# Patient Record
Sex: FEMALE | Race: WHITE | Employment: FULL TIME | URBAN - METROPOLITAN AREA
[De-identification: names, ages, dates, MRNs, and addresses within clinical notes are randomized per-mention and may not be internally consistent; named-entity substitution may affect disease eponyms.]

---

## 2017-03-06 PROBLEM — G43.119 INTRACTABLE MIGRAINE WITH AURA WITHOUT STATUS MIGRAINOSUS: Status: ACTIVE | Noted: 2017-03-06

## 2018-04-30 DIAGNOSIS — G43.119 INTRACTABLE MIGRAINE WITH AURA WITHOUT STATUS MIGRAINOSUS: ICD-10-CM

## 2018-04-30 RX ORDER — RIZATRIPTAN BENZOATE 10 MG/1
TABLET ORAL
Qty: 30 TABLET | Refills: 5 | Status: SHIPPED | OUTPATIENT
Start: 2018-04-30

## 2018-05-07 ENCOUNTER — OFFICE VISIT (OUTPATIENT)
Dept: PRIMARY CARE CLINIC | Age: 63
End: 2018-05-07
Payer: COMMERCIAL

## 2018-05-07 VITALS
BODY MASS INDEX: 19.49 KG/M2 | DIASTOLIC BLOOD PRESSURE: 60 MMHG | WEIGHT: 117 LBS | TEMPERATURE: 97.2 F | SYSTOLIC BLOOD PRESSURE: 118 MMHG | HEIGHT: 65 IN | HEART RATE: 68 BPM | OXYGEN SATURATION: 98 % | RESPIRATION RATE: 16 BRPM

## 2018-05-07 DIAGNOSIS — F32.A ANXIETY AND DEPRESSION: Primary | ICD-10-CM

## 2018-05-07 DIAGNOSIS — M62.838 MUSCLE SPASM: ICD-10-CM

## 2018-05-07 DIAGNOSIS — F41.9 ANXIETY AND DEPRESSION: Primary | ICD-10-CM

## 2018-05-07 DIAGNOSIS — G43.119 INTRACTABLE MIGRAINE WITH AURA WITHOUT STATUS MIGRAINOSUS: ICD-10-CM

## 2018-05-07 PROCEDURE — 99214 OFFICE O/P EST MOD 30 MIN: CPT | Performed by: PHYSICIAN ASSISTANT

## 2018-05-07 RX ORDER — CITALOPRAM 20 MG/1
20 TABLET ORAL DAILY
Qty: 30 TABLET | Refills: 5 | Status: CANCELLED | OUTPATIENT
Start: 2018-05-07

## 2018-05-07 RX ORDER — CITALOPRAM 40 MG/1
40 TABLET ORAL EVERY MORNING
Qty: 30 TABLET | Refills: 5 | Status: SHIPPED
Start: 2018-05-07 | End: 2022-01-10

## 2018-05-07 RX ORDER — AMITRIPTYLINE HYDROCHLORIDE 10 MG/1
TABLET, FILM COATED ORAL
Qty: 60 TABLET | Refills: 2 | Status: SHIPPED | OUTPATIENT
Start: 2018-05-07

## 2018-05-07 RX ORDER — BUTALBITAL, ACETAMINOPHEN AND CAFFEINE 50; 325; 40 MG/1; MG/1; MG/1
TABLET ORAL
Qty: 30 TABLET | Refills: 1 | Status: SHIPPED | OUTPATIENT
Start: 2018-05-07

## 2018-05-07 ASSESSMENT — PATIENT HEALTH QUESTIONNAIRE - PHQ9
1. LITTLE INTEREST OR PLEASURE IN DOING THINGS: 0
2. FEELING DOWN, DEPRESSED OR HOPELESS: 0
SUM OF ALL RESPONSES TO PHQ9 QUESTIONS 1 & 2: 0
SUM OF ALL RESPONSES TO PHQ QUESTIONS 1-9: 0

## 2019-07-19 ENCOUNTER — HOSPITAL ENCOUNTER (OUTPATIENT)
Age: 64
Discharge: HOME | End: 2019-07-21

## 2019-07-19 PROCEDURE — 88305 TISSUE EXAM BY PATHOLOGIST: CPT

## 2019-07-19 PROCEDURE — 87081 CULTURE SCREEN ONLY: CPT

## 2019-07-21 LAB — CLOTEST: NORMAL

## 2020-10-19 ENCOUNTER — OFFICE VISIT (OUTPATIENT)
Dept: PODIATRY | Age: 65
End: 2020-10-19
Payer: COMMERCIAL

## 2020-10-19 VITALS
HEIGHT: 65 IN | SYSTOLIC BLOOD PRESSURE: 118 MMHG | DIASTOLIC BLOOD PRESSURE: 64 MMHG | BODY MASS INDEX: 19.66 KG/M2 | WEIGHT: 118 LBS | TEMPERATURE: 97.2 F

## 2020-10-19 PROBLEM — M21.611 BUNION, RIGHT FOOT: Status: ACTIVE | Noted: 2020-10-19

## 2020-10-19 PROBLEM — M79.674 PAIN OF TOE OF RIGHT FOOT: Status: ACTIVE | Noted: 2020-10-19

## 2020-10-19 PROBLEM — B35.1 ONYCHOMYCOSIS: Status: ACTIVE | Noted: 2020-10-19

## 2020-10-19 PROCEDURE — 4040F PNEUMOC VAC/ADMIN/RCVD: CPT | Performed by: PODIATRIST

## 2020-10-19 PROCEDURE — 1036F TOBACCO NON-USER: CPT | Performed by: PODIATRIST

## 2020-10-19 PROCEDURE — G8420 CALC BMI NORM PARAMETERS: HCPCS | Performed by: PODIATRIST

## 2020-10-19 PROCEDURE — G8484 FLU IMMUNIZE NO ADMIN: HCPCS | Performed by: PODIATRIST

## 2020-10-19 PROCEDURE — 1123F ACP DISCUSS/DSCN MKR DOCD: CPT | Performed by: PODIATRIST

## 2020-10-19 PROCEDURE — 1090F PRES/ABSN URINE INCON ASSESS: CPT | Performed by: PODIATRIST

## 2020-10-19 PROCEDURE — 3017F COLORECTAL CA SCREEN DOC REV: CPT | Performed by: PODIATRIST

## 2020-10-19 PROCEDURE — 99213 OFFICE O/P EST LOW 20 MIN: CPT | Performed by: PODIATRIST

## 2020-10-19 PROCEDURE — G8400 PT W/DXA NO RESULTS DOC: HCPCS | Performed by: PODIATRIST

## 2020-10-19 PROCEDURE — G8427 DOCREV CUR MEDS BY ELIG CLIN: HCPCS | Performed by: PODIATRIST

## 2020-10-19 PROCEDURE — G9899 SCRN MAM PERF RSLTS DOC: HCPCS | Performed by: PODIATRIST

## 2020-10-19 NOTE — PROGRESS NOTES
10/19/20     Patrizia Mensah    : 1955 Sex: female   Age: 72 y.o. Patient was referred by: None  Patient's PCP/Provider is:  Sharif Cruz DO    Subjective:    Patient seen today for evaluation regarding dystrophy and tenderness to her right third toe. Chief Complaint   Patient presents with    Nail Problem    Callouses       HPI: She stated she injured her toe several years ago has tried multiple treatments without improvement noted. She does have discomfort with shoe gear irritation with ambulatory activities. Patient also was complaining of some mild bunion type pain to her right foot with certain shoe gear. No other additional abnormalities noted at this time. ROS:  Const: Positives and pertinent negatives as per HPI. Musculo: Denies symptoms other than stated above. Neuro: Denies symptoms other than stated above. Skin: Denies symptoms other than stated above.     Current Medications:    Current Outpatient Medications:     butalbital-acetaminophen-caffeine (FIORICET, ESGIC) -40 MG per tablet, TAKE ONE TABLET BY MOUTH EVERY 4 HOURS AS NEEDED FOR HEADACHES, Disp: 30 tablet, Rfl: 1    amitriptyline (ELAVIL) 10 MG tablet, 1 po nightly for 1 week, then increase to 2 tabs PO nightly, Disp: 60 tablet, Rfl: 2    citalopram (CELEXA) 40 MG tablet, Take 1 tablet by mouth every morning, Disp: 30 tablet, Rfl: 5    rizatriptan (MAXALT) 10 MG tablet, take 1 tablet by mouth once as needed for headache, Disp: 30 tablet, Rfl: 5    SUMAtriptan (IMITREX) 25 MG tablet, Take 25 mg by mouth once as needed for Migraine, Disp: , Rfl:     estradiol (ESTRACE VAGINAL) 0.1 MG/GM vaginal cream, Apply pea size amount vaginally 3 times a week at night time, Disp: 60 g, Rfl: 1    omeprazole (PRILOSEC) 40 MG delayed release capsule, , Disp: , Rfl:     ipratropium (ATROVENT) 0.03 % nasal spray, 2 sprays by Nasal route 3 times daily, Disp: 1 Bottle, Rfl: 0    vitamin D 1000 UNITS CAPS, Take 1 capsule by mouth daily, Disp: , Rfl:     ascorbic acid (VITAMIN C) 500 MG tablet, Take 500 mg by mouth daily 2 tablets daily, Disp: , Rfl:     Biotin 5000 MCG CAPS, Take by mouth, Disp: , Rfl:     acetaminophen (TYLENOL) 500 MG tablet, Take 1,000 mg by mouth as needed for Pain, Disp: , Rfl:     Allergies:  No Known Allergies    Vitals:    10/19/20 0813   BP: 118/64   Temp: 97.2 °F (36.2 °C)   Weight: 118 lb (53.5 kg)   Height: 5' 5\" (1.651 m)        Past Medical History:   Diagnosis Date    Cephalgia     Tension    Migraine     She has has accupuncture and has been getting massage     Family History   Problem Relation Age of Onset    Diabetes Maternal Grandfather     Colon Cancer Maternal Grandfather     Hypertension Father     Heart Disease Father     Stroke Mother     Hypertension Mother     Osteoporosis Mother     Thyroid Disease Mother     Migraines Mother     Arthritis Brother      Past Surgical History:   Procedure Laterality Date    BREAST ENHANCEMENT SURGERY      FE BREAST AUGMENTATION     COLONOSCOPY  2014    HERNIA REPAIR Left     inguinal    NOSE SURGERY Bilateral     deviated septum    UPPER GASTROINTESTINAL ENDOSCOPY       Social History     Tobacco Use    Smoking status: Former Smoker     Packs/day: 1.00     Years: 10.00     Pack years: 10.00     Last attempt to quit: 3/24/1982     Years since quittin.6    Smokeless tobacco: Never Used   Substance Use Topics    Alcohol use: Yes     Comment: occasionally    Drug use: No           Diagnostic studies:    No results found. Procedures:    None    Exam:  VASCULAR: Pedal pulses palpable right foot. Capillary fill time brisk digits 1 through 5 right foot. NEUROLOGICAL: Epicritic sensations intact right foot. DERMATOLOGICAL: No edema or ecchymotic skin changes present right foot. Dystrophy and discoloration present to the right third toe. Tenderness noted to palpation.   No ingrown nail borders noted or any signs of bacterial infection noted. No maceration webspaces noted right foot. MUSCULOSKELETAL: Mild bunion deformity noted right foot with tenderness dorsal medial eminence. Adequate range of motion right great toe first MTPJ region. Plan Per Assessment  Chloe Lidnsay was seen today for nail problem and callouses. Diagnoses and all orders for this visit:    Onychomycosis    Pain of toe of right foot    Bunion, right foot        1. New patient evaluation and management  2. We did recommend the use of a topical nail lacquer to the affected digital nail to be applied daily. Prescription medication Penlac given with exact instructions on usage. I discussed the potential side effects that the patient may experience with the medication and the need to call if they experience any. 3. We did discuss the use of OTC insole to be worn to prevent worsening of the bunion issue right foot. 4. Patient will be followed up in 6 weeks time or sooner if needed for reevaluation. She was advised to call the office with any questions or concerns in the interim. Seen By:    Martínez Chowdary DPM    Electronically signed by Martínez Chowdary DPM on 10/19/2020 at 8:27 AM      This note was created using voice recognition software. The note was reviewed however may contain grammatical errors.

## 2020-10-19 NOTE — PROGRESS NOTES
Patient is here today for evaluation of 3rd right toe nail. She states she had an injury to it a few years ago. She is a previous patient but has not been here in a few years. She also has a callus on the right foot.  pcp is Shira Martino DO last ov 12-

## 2020-12-23 ENCOUNTER — OFFICE VISIT (OUTPATIENT)
Dept: PODIATRY | Age: 65
End: 2020-12-23
Payer: COMMERCIAL

## 2020-12-23 VITALS — HEIGHT: 65 IN | WEIGHT: 118 LBS | BODY MASS INDEX: 19.66 KG/M2

## 2020-12-23 PROCEDURE — G9899 SCRN MAM PERF RSLTS DOC: HCPCS | Performed by: PODIATRIST

## 2020-12-23 PROCEDURE — 3017F COLORECTAL CA SCREEN DOC REV: CPT | Performed by: PODIATRIST

## 2020-12-23 PROCEDURE — G8484 FLU IMMUNIZE NO ADMIN: HCPCS | Performed by: PODIATRIST

## 2020-12-23 PROCEDURE — 1123F ACP DISCUSS/DSCN MKR DOCD: CPT | Performed by: PODIATRIST

## 2020-12-23 PROCEDURE — G8420 CALC BMI NORM PARAMETERS: HCPCS | Performed by: PODIATRIST

## 2020-12-23 PROCEDURE — 1036F TOBACCO NON-USER: CPT | Performed by: PODIATRIST

## 2020-12-23 PROCEDURE — 1090F PRES/ABSN URINE INCON ASSESS: CPT | Performed by: PODIATRIST

## 2020-12-23 PROCEDURE — 4040F PNEUMOC VAC/ADMIN/RCVD: CPT | Performed by: PODIATRIST

## 2020-12-23 PROCEDURE — G8427 DOCREV CUR MEDS BY ELIG CLIN: HCPCS | Performed by: PODIATRIST

## 2020-12-23 PROCEDURE — 99213 OFFICE O/P EST LOW 20 MIN: CPT | Performed by: PODIATRIST

## 2020-12-23 PROCEDURE — G8399 PT W/DXA RESULTS DOCUMENT: HCPCS | Performed by: PODIATRIST

## 2020-12-23 NOTE — PROGRESS NOTES
Patient is in today for 6 week follow up of right 4th toe nail issue. Patient has been using the medication as prescribed and has no additional issues at this time.  pcp is Lazara Nieves DO

## 2020-12-23 NOTE — PROGRESS NOTES
20     Talia Carvalho    : 1955   Sex: female    Age: 72 y.o. Patient's PCP/Provider is:  Ha Up DO    Subjective:  Patient is seen today for follow-up regarding continued care regarding dystrophy digital nail right foot. She is also interested in discussing treatment options regarding bunion issues right foot. No other additional abnormalities noted at this time. Chief Complaint   Patient presents with    Nail Problem     nail fungus, right 3rd toe        ROS:  Const: Positives and pertinent negatives as per HPI. Musculo: Denies symptoms other than stated above. Neuro: Denies symptoms other than stated above. Skin: Denies symptoms other than stated above. Current Medications:    Current Outpatient Medications:     ciclopirox (PENLAC) 8 % solution, Apply topically daily to affected nails. , Disp: 6 mL, Rfl: 2    butalbital-acetaminophen-caffeine (FIORICET, ESGIC) -40 MG per tablet, TAKE ONE TABLET BY MOUTH EVERY 4 HOURS AS NEEDED FOR HEADACHES, Disp: 30 tablet, Rfl: 1    amitriptyline (ELAVIL) 10 MG tablet, 1 po nightly for 1 week, then increase to 2 tabs PO nightly, Disp: 60 tablet, Rfl: 2    citalopram (CELEXA) 40 MG tablet, Take 1 tablet by mouth every morning, Disp: 30 tablet, Rfl: 5    rizatriptan (MAXALT) 10 MG tablet, take 1 tablet by mouth once as needed for headache, Disp: 30 tablet, Rfl: 5    SUMAtriptan (IMITREX) 25 MG tablet, Take 25 mg by mouth once as needed for Migraine, Disp: , Rfl:     omeprazole (PRILOSEC) 40 MG delayed release capsule, , Disp: , Rfl:     vitamin D 1000 UNITS CAPS, Take 1 capsule by mouth daily, Disp: , Rfl:     ascorbic acid (VITAMIN C) 500 MG tablet, Take 500 mg by mouth daily 2 tablets daily, Disp: , Rfl:     Biotin 5000 MCG CAPS, Take by mouth, Disp: , Rfl:     acetaminophen (TYLENOL) 500 MG tablet, Take 1,000 mg by mouth as needed for Pain, Disp: , Rfl:     ipratropium (ATROVENT) 0.03 % nasal spray, 2 sprays by Nasal route 3 times daily, Disp: 1 Bottle, Rfl: 0    Allergies:  No Known Allergies    Vitals:    12/23/20 0700   Weight: 118 lb (53.5 kg)   Height: 5' 5\" (1.651 m)       Exam:  Neurovascular status unchanged. Right third digital nail is improved to the proximal third of the nailbed region. Mild dystrophy noted distal aspect with tenderness noted to palpation. No signs of infection noted right third toe. Bunion issue noted right foot with prominence at the dorsal medial eminence. Adequate range of motion right great toe first MTPJ region. No edema or ecchymotic skin changes present medial right forefoot region. Diagnostic Studies:     No results found. Procedures:    None    Plan Per Assessment  Frida Jenkins was seen today for nail problem. Diagnoses and all orders for this visit:    Onychomycosis    Pain of toe of right foot  -     Amb External Referral For Orthotics    Bunion, right foot  -     Amb External Referral For Orthotics      1. Evaluation and management  2. We did discuss continued use of the topical nail regimen regarding the digital nail issue daily. 3. Patient was given information on purchasing OTC insoles to be utilized with her existing shoe gear to prevent potential bunion issues right foot. Shoe recommendations were discussed with patient in detail today as well. 4. Patient will be followed up at a later date for continued evaluation and management. Seen By:    Constanza Genao DPM    Electronically signed by Constanza Genao DPM on 12/23/2020 at 7:21 AM    This note was created using voice recognition software. The note was reviewed however may contain grammatical errors.

## 2021-02-03 ENCOUNTER — OFFICE VISIT (OUTPATIENT)
Dept: PODIATRY | Age: 66
End: 2021-02-03
Payer: COMMERCIAL

## 2021-02-03 VITALS — WEIGHT: 118 LBS | BODY MASS INDEX: 19.66 KG/M2 | HEIGHT: 65 IN

## 2021-02-03 DIAGNOSIS — B35.1 ONYCHOMYCOSIS: Primary | ICD-10-CM

## 2021-02-03 DIAGNOSIS — M79.674 PAIN OF TOE OF RIGHT FOOT: ICD-10-CM

## 2021-02-03 DIAGNOSIS — M21.611 BUNION, RIGHT FOOT: ICD-10-CM

## 2021-02-03 PROCEDURE — 1090F PRES/ABSN URINE INCON ASSESS: CPT | Performed by: PODIATRIST

## 2021-02-03 PROCEDURE — G8399 PT W/DXA RESULTS DOCUMENT: HCPCS | Performed by: PODIATRIST

## 2021-02-03 PROCEDURE — 99212 OFFICE O/P EST SF 10 MIN: CPT | Performed by: PODIATRIST

## 2021-02-03 PROCEDURE — 4040F PNEUMOC VAC/ADMIN/RCVD: CPT | Performed by: PODIATRIST

## 2021-02-03 PROCEDURE — G9899 SCRN MAM PERF RSLTS DOC: HCPCS | Performed by: PODIATRIST

## 2021-02-03 PROCEDURE — G8427 DOCREV CUR MEDS BY ELIG CLIN: HCPCS | Performed by: PODIATRIST

## 2021-02-03 PROCEDURE — 1123F ACP DISCUSS/DSCN MKR DOCD: CPT | Performed by: PODIATRIST

## 2021-02-03 PROCEDURE — 1036F TOBACCO NON-USER: CPT | Performed by: PODIATRIST

## 2021-02-03 PROCEDURE — 3017F COLORECTAL CA SCREEN DOC REV: CPT | Performed by: PODIATRIST

## 2021-02-03 PROCEDURE — G8484 FLU IMMUNIZE NO ADMIN: HCPCS | Performed by: PODIATRIST

## 2021-02-03 PROCEDURE — G8420 CALC BMI NORM PARAMETERS: HCPCS | Performed by: PODIATRIST

## 2021-02-03 NOTE — PROGRESS NOTES
2/3/21     Sunshine Lawler    : 1955   Sex: female    Age: 72 y.o. Patient's PCP/Provider is:  Melinda Singh DO    Subjective:  Patient is seen today for follow-up regarding continued care mycotic nail issues to her right third digital area. Patient has been applying the topical medication as instructed without issues. No other additional abnormalities noted at this time. Chief Complaint   Patient presents with    Toe Pain     3rd toe right       ROS:  Const: Positives and pertinent negatives as per HPI. Musculo: Denies symptoms other than stated above. Neuro: Denies symptoms other than stated above. Skin: Denies symptoms other than stated above. Current Medications:    Current Outpatient Medications:     ciclopirox (PENLAC) 8 % solution, Apply topically daily to affected nails. , Disp: 6 mL, Rfl: 2    butalbital-acetaminophen-caffeine (FIORICET, ESGIC) -40 MG per tablet, TAKE ONE TABLET BY MOUTH EVERY 4 HOURS AS NEEDED FOR HEADACHES, Disp: 30 tablet, Rfl: 1    amitriptyline (ELAVIL) 10 MG tablet, 1 po nightly for 1 week, then increase to 2 tabs PO nightly, Disp: 60 tablet, Rfl: 2    citalopram (CELEXA) 40 MG tablet, Take 1 tablet by mouth every morning, Disp: 30 tablet, Rfl: 5    rizatriptan (MAXALT) 10 MG tablet, take 1 tablet by mouth once as needed for headache, Disp: 30 tablet, Rfl: 5    SUMAtriptan (IMITREX) 25 MG tablet, Take 25 mg by mouth once as needed for Migraine, Disp: , Rfl:     omeprazole (PRILOSEC) 40 MG delayed release capsule, , Disp: , Rfl:     vitamin D 1000 UNITS CAPS, Take 1 capsule by mouth daily, Disp: , Rfl:     ascorbic acid (VITAMIN C) 500 MG tablet, Take 500 mg by mouth daily 2 tablets daily, Disp: , Rfl:     Biotin 5000 MCG CAPS, Take by mouth, Disp: , Rfl:     acetaminophen (TYLENOL) 500 MG tablet, Take 1,000 mg by mouth as needed for Pain, Disp: , Rfl:     ipratropium (ATROVENT) 0.03 % nasal spray, 2 sprays by Nasal route 3 times daily, Disp: 1 Bottle, Rfl: 0    Allergies:  No Known Allergies    Vitals:    02/03/21 0708   Weight: 118 lb (53.5 kg)   Height: 5' 5\" (1.651 m)       Exam:  Neurovascular status unchanged. Digital nail right third toe is improved with thickness and coloration to the proximal third of the nailbed region. No signs of ingrown nail issues noted. No maceration webspaces noted right foot. No signs of infection noted digital regions right foot. Diagnostic Studies:     No results found. Procedures:    None    Plan Per Assessment  Scott Reynolds was seen today for toe pain. Diagnoses and all orders for this visit:    Onychomycosis    Pain of toe of right foot    Bunion, right foot      1. Evaluation and management  2. We did discuss additional treatment options with use of the OTC topical medications to be performed daily. 3. Patient was advised continued use of her good supportive insoles to help prevent worsening of the bunion issues bilateral foot. 4. Patient will be followed up in 2 months time or sooner if needed for reevaluation. She was advised to call the office with any questions or concerns in the interim. Seen By:    Suki Suarez DPM    Electronically signed by Suki Suarez DPM on 2/3/2021 at 8:13 AM    This note was created using voice recognition software. The note was reviewed however may contain grammatical errors.

## 2021-02-03 NOTE — PROGRESS NOTES
Patient here for a 6 week follow up 3rd toe right foot. Patient has no new concerns.           Electronically signed by Jenna Banuelos MA on 2/3/2021 at 7:07 AM

## 2021-03-31 ENCOUNTER — HOSPITAL ENCOUNTER (OUTPATIENT)
Age: 66
Discharge: HOME | End: 2021-04-02

## 2021-03-31 PROCEDURE — 88305 TISSUE EXAM BY PATHOLOGIST: CPT

## 2021-03-31 PROCEDURE — 88311 DECALCIFY TISSUE: CPT

## 2021-04-14 ENCOUNTER — OFFICE VISIT (OUTPATIENT)
Dept: PODIATRY | Age: 66
End: 2021-04-14
Payer: COMMERCIAL

## 2021-04-14 VITALS — HEIGHT: 65 IN | WEIGHT: 118 LBS | BODY MASS INDEX: 19.66 KG/M2

## 2021-04-14 DIAGNOSIS — M79.674 PAIN OF TOE OF RIGHT FOOT: ICD-10-CM

## 2021-04-14 DIAGNOSIS — L60.0 OC (ONYCHOCRYPTOSIS): ICD-10-CM

## 2021-04-14 DIAGNOSIS — B35.1 ONYCHOMYCOSIS: Primary | ICD-10-CM

## 2021-04-14 PROCEDURE — 1036F TOBACCO NON-USER: CPT | Performed by: PODIATRIST

## 2021-04-14 PROCEDURE — 99212 OFFICE O/P EST SF 10 MIN: CPT | Performed by: PODIATRIST

## 2021-04-14 PROCEDURE — 3017F COLORECTAL CA SCREEN DOC REV: CPT | Performed by: PODIATRIST

## 2021-04-14 PROCEDURE — G9899 SCRN MAM PERF RSLTS DOC: HCPCS | Performed by: PODIATRIST

## 2021-04-14 PROCEDURE — 4040F PNEUMOC VAC/ADMIN/RCVD: CPT | Performed by: PODIATRIST

## 2021-04-14 PROCEDURE — G8420 CALC BMI NORM PARAMETERS: HCPCS | Performed by: PODIATRIST

## 2021-04-14 PROCEDURE — G8427 DOCREV CUR MEDS BY ELIG CLIN: HCPCS | Performed by: PODIATRIST

## 2021-04-14 PROCEDURE — 1123F ACP DISCUSS/DSCN MKR DOCD: CPT | Performed by: PODIATRIST

## 2021-04-14 PROCEDURE — G8399 PT W/DXA RESULTS DOCUMENT: HCPCS | Performed by: PODIATRIST

## 2021-04-14 PROCEDURE — 1090F PRES/ABSN URINE INCON ASSESS: CPT | Performed by: PODIATRIST

## 2021-04-14 NOTE — PROGRESS NOTES
Patient is here for follow up for fungus lwft 3rd toe. Patient is using Penlac. Patient states the nail is really thick.  Carly Razor, DO 3/31/21  Electronically signed by Lesa Schultz LPN on 5/20/4808 at 8:19 AM

## 2021-05-23 ENCOUNTER — APPOINTMENT (OUTPATIENT)
Dept: GENERAL RADIOLOGY | Age: 66
End: 2021-05-23
Payer: COMMERCIAL

## 2021-05-23 ENCOUNTER — HOSPITAL ENCOUNTER (EMERGENCY)
Age: 66
Discharge: HOME | End: 2021-05-23
Attending: EMERGENCY MEDICINE
Payer: COMMERCIAL

## 2021-05-23 VITALS
HEIGHT: 65 IN | OXYGEN SATURATION: 98 % | HEART RATE: 76 BPM | SYSTOLIC BLOOD PRESSURE: 116 MMHG | BODY MASS INDEX: 19.66 KG/M2 | WEIGHT: 118 LBS | RESPIRATION RATE: 18 BRPM | TEMPERATURE: 96.8 F | DIASTOLIC BLOOD PRESSURE: 60 MMHG

## 2021-05-23 DIAGNOSIS — M70.41 PREPATELLAR BURSITIS OF RIGHT KNEE: Primary | ICD-10-CM

## 2021-05-23 PROCEDURE — 99284 EMERGENCY DEPT VISIT MOD MDM: CPT

## 2021-05-23 PROCEDURE — 73560 X-RAY EXAM OF KNEE 1 OR 2: CPT

## 2021-05-23 NOTE — ED PROVIDER NOTES
Migraines in her mother; Osteoporosis in her mother; Stroke in her mother; Thyroid Disease in her mother. Allergies: Patient has no known allergies. Physical Exam   Oxygen Saturation Interpretation: Normal.        ED Triage Vitals   BP Temp Temp Source Pulse Resp SpO2 Height Weight   05/23/21 1533 05/23/21 1533 05/23/21 1533 05/23/21 1533 05/23/21 1533 05/23/21 1533 05/23/21 1538 05/23/21 1538   116/60 96.8 °F (36 °C) Infrared 76 18 98 % 5' 5\" (1.651 m) 118 lb (53.5 kg)         Constitutional:  Alert, development consistent with age. Neck:  Normal ROM. Supple. Knee:  Right patellar:             Tenderness:  mild. .              Swelling/Effusion: pre-patellar mild. Deformity: no.              ROM: full range with pain. Skin: Indurated fluctuant area overlying the patella no erythema, warmth, drainage or wounds. Drawer's:  Negative. Lachman's: Negative. Apley's: Negative. Dotty's: Negative. Valgus/Varus Stress: Negative. Crepitus: Negative. Hip:            Tenderness:  none. Swelling: None. Deformity: no.              ROM: full range of motion. Skin:  normal exam; no wounds, erythema, or swelling. Joint(s) Below: ankle. Tenderness:  none. Swelling: No.              Deformity: no.             ROM: full range of motion. Skin:  normal exam; no wounds, erythema, or swelling. Neurovascular: Motor deficit: none. Sensory deficit: none. Pulse deficit: none. Capillary refill: normal.  Gait:  normal.  Lymphatics: No lymphangitis or adenopathy noted. Neurological:  Oriented. Motor functions intact. Lab / Imaging Results   (All laboratory and radiology results have been personally reviewed by myself)  Labs:  No results found for this visit on 05/23/21. Imaging:   All Radiology results interpreted by Radiologist unless otherwise noted. XR KNEE RIGHT (1-2 VIEWS)   Final Result   No fracture or dislocation. Joint spaces are intact. ED Course / Medical Decision Making   Medications - No data to display     Consult(s):   None    Procedure(s):   none. MDM:     Imaging was obtained based on moderate suspicion for fracture / bony abnormality, dislocation as per history/physical findings, negative. Induration/fluctuant overlying the right patella however no erythema, warmth, or decreased range of motion to suggest infectious process. Symptoms consistent with  pre-patellar bursitis. Plan is subsequently for symptom control, limited use as tolerated, rice  with appropriate outpatient follow-up for reevaluation. Educated on signs and symptoms which require emergent evaluation. Plan of Care/Counseling:  Myself reviewed today's visit with the patient in addition to providing specific details for the plan of care and counseling regarding the diagnosis and prognosis. Questions are answered at this time and are agreeable with the plan. Assessment      1. Prepatellar bursitis of right knee      Plan   Discharge home. Patient condition is good    New Medications     Discharge Medication List as of 5/23/2021  5:04 PM        Electronically signed by EMANUEL Santiago CNP   DD: 5/23/21  **This report was transcribed using voice recognition software. Every effort was made to ensure accuracy; however, inadvertent computerized transcription errors may be present.   END OF ED PROVIDER NOTE      EMANUEL Sawant CNP  05/23/21 2384

## 2021-06-16 ENCOUNTER — OFFICE VISIT (OUTPATIENT)
Dept: PODIATRY | Age: 66
End: 2021-06-16
Payer: COMMERCIAL

## 2021-06-16 VITALS — BODY MASS INDEX: 19.66 KG/M2 | WEIGHT: 118 LBS | HEIGHT: 65 IN

## 2021-06-16 DIAGNOSIS — B35.1 ONYCHOMYCOSIS: Primary | ICD-10-CM

## 2021-06-16 DIAGNOSIS — M79.672 PAIN IN LEFT FOOT: ICD-10-CM

## 2021-06-16 DIAGNOSIS — M72.2 PLANTAR FASCIITIS: ICD-10-CM

## 2021-06-16 PROCEDURE — 1123F ACP DISCUSS/DSCN MKR DOCD: CPT | Performed by: PODIATRIST

## 2021-06-16 PROCEDURE — G9899 SCRN MAM PERF RSLTS DOC: HCPCS | Performed by: PODIATRIST

## 2021-06-16 PROCEDURE — 99213 OFFICE O/P EST LOW 20 MIN: CPT | Performed by: PODIATRIST

## 2021-06-16 PROCEDURE — G8420 CALC BMI NORM PARAMETERS: HCPCS | Performed by: PODIATRIST

## 2021-06-16 PROCEDURE — 1090F PRES/ABSN URINE INCON ASSESS: CPT | Performed by: PODIATRIST

## 2021-06-16 PROCEDURE — 1036F TOBACCO NON-USER: CPT | Performed by: PODIATRIST

## 2021-06-16 PROCEDURE — 4040F PNEUMOC VAC/ADMIN/RCVD: CPT | Performed by: PODIATRIST

## 2021-06-16 PROCEDURE — G8427 DOCREV CUR MEDS BY ELIG CLIN: HCPCS | Performed by: PODIATRIST

## 2021-06-16 PROCEDURE — 3017F COLORECTAL CA SCREEN DOC REV: CPT | Performed by: PODIATRIST

## 2021-06-16 PROCEDURE — G8399 PT W/DXA RESULTS DOCUMENT: HCPCS | Performed by: PODIATRIST

## 2021-06-16 NOTE — PROGRESS NOTES
21     Yaw Embs    : 1955   Sex: female    Age: 72 y.o. Patient's PCP/Provider is:  Vicky Ellison DO    Subjective:  Patient is seen today for follow-up regarding continued evaluation regarding mycosis to her right third digital nail. Patient has noticed improvement with current topical regimen. Patient also complaining of some pain into her left heel region which has been going on over the last 2 weeks. She denies any recent injury or changes in activities. No other additional abnormalities noted at this time. Chief Complaint   Patient presents with    Follow-up     right 3rd toenail fungus    Foot Pain       ROS:  Const: Positives and pertinent negatives as per HPI. Musculo: Denies symptoms other than stated above. Neuro: Denies symptoms other than stated above. Skin: Denies symptoms other than stated above. Current Medications:    Current Outpatient Medications:     ciclopirox (PENLAC) 8 % solution, Apply topically daily to affected nails. , Disp: 6 mL, Rfl: 2    butalbital-acetaminophen-caffeine (FIORICET, ESGIC) -40 MG per tablet, TAKE ONE TABLET BY MOUTH EVERY 4 HOURS AS NEEDED FOR HEADACHES, Disp: 30 tablet, Rfl: 1    amitriptyline (ELAVIL) 10 MG tablet, 1 po nightly for 1 week, then increase to 2 tabs PO nightly, Disp: 60 tablet, Rfl: 2    citalopram (CELEXA) 40 MG tablet, Take 1 tablet by mouth every morning, Disp: 30 tablet, Rfl: 5    rizatriptan (MAXALT) 10 MG tablet, take 1 tablet by mouth once as needed for headache, Disp: 30 tablet, Rfl: 5    SUMAtriptan (IMITREX) 25 MG tablet, Take 25 mg by mouth once as needed for Migraine, Disp: , Rfl:     omeprazole (PRILOSEC) 40 MG delayed release capsule, , Disp: , Rfl:     vitamin D 1000 UNITS CAPS, Take 1 capsule by mouth daily, Disp: , Rfl:     ascorbic acid (VITAMIN C) 500 MG tablet, Take 500 mg by mouth daily 2 tablets daily, Disp: , Rfl:     Biotin 5000 MCG CAPS, Take by mouth, Disp: , Rfl:    acetaminophen (TYLENOL) 500 MG tablet, Take 1,000 mg by mouth as needed for Pain, Disp: , Rfl:     ipratropium (ATROVENT) 0.03 % nasal spray, 2 sprays by Nasal route 3 times daily, Disp: 1 Bottle, Rfl: 0    Allergies:  No Known Allergies    Vitals:    06/16/21 0705   Weight: 118 lb (53.5 kg)   Height: 5' 5\" (1.651 m)       Exam:  Neurovascular status unchanged. Right third digital nail is improved to the proximal two thirds of the nailbed region. No ingrown nail issues noted right foot. No maceration of webspaces noted right foot. Tenderness noted to palpation to the plantar left heel region with active range of motion and muscle testing performed. No edema or ecchymotic skin changes present left foot. Diagnostic Studies:       XR KNEE RIGHT (1-2 VIEWS)    Result Date: 5/23/2021  EXAMINATION: TWO XRAY VIEWS OF THE RIGHT KNEE 5/23/2021 4:18 pm COMPARISON: None. HISTORY: ORDERING SYSTEM PROVIDED HISTORY: pain TECHNOLOGIST PROVIDED HISTORY: Reason for exam:->pain FINDINGS: There is no fracture or dislocation. Patella is in normal position. No synovial effusion. Joint spaces are well maintained. No fracture or dislocation. Joint spaces are intact. Procedures:    None    Plan Per Assessment  Misha López was seen today for follow-up and foot pain. Diagnoses and all orders for this visit:    Onychomycosis    Plantar fasciitis    Pain in left foot      1. Evaluation and management  2. We did discuss continued use of a different topical regimen to the right third digital nail to allow for complete resolution of symptoms. 3. We did recommend daily stretching and strengthening exercises and use of ice massage techniques to reduce her current plantar fascial issues. New recommendations and OTC insoles discussed as well. 4. Patient will be followed up at a later date for continued evaluation and management.       Seen By:    Nahed Blanchard DPM    Electronically signed by Nahed Blanchard DPM on 6/16/2021 at 7:49 AM    This note was created using voice recognition software. The note was reviewed however may contain grammatical errors.

## 2021-06-16 NOTE — PROGRESS NOTES
Patient is here for follow up right 3rd toe nail fungus. Patient as concern about left heel. It has been causing pain. Pain has been for about 2 week.  Best Harrell DO 10/15/2020  Electronically signed by Ingrid Tidwell LPN on 7/70/5214 at 8:03 AM

## 2022-08-25 ENCOUNTER — HOSPITAL ENCOUNTER (OUTPATIENT)
Dept: GENERAL RADIOLOGY | Age: 67
Discharge: HOME | End: 2022-08-27
Payer: COMMERCIAL

## 2022-08-25 ENCOUNTER — HOSPITAL ENCOUNTER (OUTPATIENT)
Age: 67
Discharge: HOME | End: 2022-08-27
Payer: COMMERCIAL

## 2022-08-25 DIAGNOSIS — M25.532 LEFT WRIST PAIN: ICD-10-CM

## 2022-08-25 DIAGNOSIS — S52.125A CLOSED NONDISPLACED FRACTURE OF HEAD OF LEFT RADIUS, INITIAL ENCOUNTER: ICD-10-CM

## 2022-08-25 PROCEDURE — 73080 X-RAY EXAM OF ELBOW: CPT

## 2022-08-25 PROCEDURE — 73110 X-RAY EXAM OF WRIST: CPT

## 2022-09-15 ENCOUNTER — HOSPITAL ENCOUNTER (OUTPATIENT)
Age: 67
Discharge: HOME | End: 2022-09-17
Payer: COMMERCIAL

## 2022-09-15 ENCOUNTER — HOSPITAL ENCOUNTER (OUTPATIENT)
Dept: GENERAL RADIOLOGY | Age: 67
Discharge: HOME | End: 2022-09-17
Payer: COMMERCIAL

## 2022-09-15 DIAGNOSIS — S52.125D: ICD-10-CM

## 2022-09-15 DIAGNOSIS — S52.125A CLOSED NONDISPLACED FRACTURE OF HEAD OF LEFT RADIUS, INITIAL ENCOUNTER: ICD-10-CM

## 2022-09-15 PROCEDURE — 73080 X-RAY EXAM OF ELBOW: CPT

## 2022-09-28 ENCOUNTER — OFFICE VISIT (OUTPATIENT)
Dept: PODIATRY | Age: 67
End: 2022-09-28
Payer: COMMERCIAL

## 2022-09-28 VITALS — WEIGHT: 119 LBS | HEIGHT: 65 IN | BODY MASS INDEX: 19.83 KG/M2

## 2022-09-28 DIAGNOSIS — M72.2 PLANTAR FASCIITIS: Primary | ICD-10-CM

## 2022-09-28 DIAGNOSIS — B35.1 ONYCHOMYCOSIS: ICD-10-CM

## 2022-09-28 DIAGNOSIS — L84 CORNS AND CALLUS: ICD-10-CM

## 2022-09-28 PROCEDURE — 99213 OFFICE O/P EST LOW 20 MIN: CPT | Performed by: PODIATRIST

## 2022-09-28 PROCEDURE — G8420 CALC BMI NORM PARAMETERS: HCPCS | Performed by: PODIATRIST

## 2022-09-28 PROCEDURE — 1090F PRES/ABSN URINE INCON ASSESS: CPT | Performed by: PODIATRIST

## 2022-09-28 PROCEDURE — G9899 SCRN MAM PERF RSLTS DOC: HCPCS | Performed by: PODIATRIST

## 2022-09-28 PROCEDURE — 1123F ACP DISCUSS/DSCN MKR DOCD: CPT | Performed by: PODIATRIST

## 2022-09-28 PROCEDURE — G8399 PT W/DXA RESULTS DOCUMENT: HCPCS | Performed by: PODIATRIST

## 2022-09-28 PROCEDURE — 3017F COLORECTAL CA SCREEN DOC REV: CPT | Performed by: PODIATRIST

## 2022-09-28 PROCEDURE — G8427 DOCREV CUR MEDS BY ELIG CLIN: HCPCS | Performed by: PODIATRIST

## 2022-09-28 PROCEDURE — 1036F TOBACCO NON-USER: CPT | Performed by: PODIATRIST

## 2022-09-28 NOTE — PROGRESS NOTES
Patient in today for nail care. Patient does not have any complaints of pain at this time.  Patient's PCP is Rafa Lam DO date of last ov 10/15/2021        Kim Islas LPN

## 2022-09-28 NOTE — PROGRESS NOTES
22     Manda Dangelo    : 1955   Sex: female    Age: 77 y.o. Patient's PCP/Provider is:  Lily Duverney, DO    Subjective:  Patient is seen today for follow-up regarding continued evaluation regarding fasciitis issues, corn/callus issues and ingrown issues to digital nails bilaterally. Patient is trying to wear good supportive shoe gear with every day activities and utilizing the topical lotions as recommended. No other additional abnormalities noted at this time. Chief Complaint   Patient presents with    Callouses    Nail Problem       ROS:  Const: Positives and pertinent negatives as per HPI. Musculo: Denies symptoms other than stated above. Neuro: Denies symptoms other than stated above. Skin: Denies symptoms other than stated above. Current Medications:    Current Outpatient Medications:     estradiol (ESTRACE VAGINAL) 0.1 MG/GM vaginal cream, Apply pea size amount vaginally 3 times a week at night time, Disp: 60 g, Rfl: 3    ciclopirox (PENLAC) 8 % solution, Apply topically daily to affected nails. , Disp: 6 mL, Rfl: 2    butalbital-acetaminophen-caffeine (FIORICET, ESGIC) -40 MG per tablet, TAKE ONE TABLET BY MOUTH EVERY 4 HOURS AS NEEDED FOR HEADACHES, Disp: 30 tablet, Rfl: 1    amitriptyline (ELAVIL) 10 MG tablet, 1 po nightly for 1 week, then increase to 2 tabs PO nightly, Disp: 60 tablet, Rfl: 2    rizatriptan (MAXALT) 10 MG tablet, take 1 tablet by mouth once as needed for headache, Disp: 30 tablet, Rfl: 5    SUMAtriptan (IMITREX) 25 MG tablet, Take 25 mg by mouth once as needed for Migraine, Disp: , Rfl:     omeprazole (PRILOSEC) 40 MG delayed release capsule, , Disp: , Rfl:     vitamin D 1000 UNITS CAPS, Take 1 capsule by mouth daily, Disp: , Rfl:     ascorbic acid (VITAMIN C) 500 MG tablet, Take 500 mg by mouth daily 2 tablets daily, Disp: , Rfl:     Biotin 5000 MCG CAPS, Take by mouth, Disp: , Rfl:     Allergies:  No Known Allergies    Vitals:    22 9477 Weight: 119 lb (54 kg)   Height: 5' 5\" (1.651 m)       Exam:  Neurovascular status unchanged. Adequate range of motion noted ankle and subtalar joint regions with minimal tenderness into the plantar fascial areas bilaterally. Mild callused regions noted bilateral fifth MTPJ areas. Mild nail dystrophy and ingrown issues noted digital nails bilateral foot. Maceration of webspaces noted bilateral foot. No other additional abnormalities noted. Diagnostic Studies:     No results found. Procedures:    None    Plan Per Assessment  Eulogio Posadas was seen today for callouses and nail problem. Diagnoses and all orders for this visit:    Plantar fasciitis    Onychomycosis    Corns and callus      Evaluation and management  We did discuss continued use of the moisturizing lotion regarding the corn/callused regions. Debridement dystrophic nails performed to patient tolerance. Did recommend continued use of her good supportive shoe gear and insoles with all ambulatory activities. Patient will be followed up at a later date for continued evaluation and management. Seen By:    Magan Christine DPM    Electronically signed by Magan Christine DPM on 9/28/2022 at 7:32 AM    This note was created using voice recognition software. The note was reviewed however may contain grammatical errors.

## 2022-10-13 ENCOUNTER — HOSPITAL ENCOUNTER (OUTPATIENT)
Age: 67
Discharge: HOME | End: 2022-10-15
Payer: COMMERCIAL

## 2022-10-13 ENCOUNTER — HOSPITAL ENCOUNTER (OUTPATIENT)
Dept: GENERAL RADIOLOGY | Age: 67
Discharge: HOME | End: 2022-10-15
Payer: COMMERCIAL

## 2022-10-13 DIAGNOSIS — S52.125D NONDISPLACED FRACTURE OF HEAD OF LEFT RADIUS, SUBSEQUENT ENCOUNTER FOR CLOSED FRACTURE WITH ROUTINE HEALING: ICD-10-CM

## 2022-10-13 DIAGNOSIS — S52.125A: ICD-10-CM

## 2022-10-13 PROCEDURE — 73080 X-RAY EXAM OF ELBOW: CPT

## 2023-01-11 PROBLEM — M85.89 OSTEOPENIA OF MULTIPLE SITES: Status: ACTIVE | Noted: 2023-01-11

## 2023-07-05 ENCOUNTER — HOSPITAL ENCOUNTER (EMERGENCY)
Age: 68
Discharge: HOME | End: 2023-07-05
Attending: EMERGENCY MEDICINE
Payer: MEDICARE

## 2023-07-05 VITALS
BODY MASS INDEX: 19.97 KG/M2 | RESPIRATION RATE: 16 BRPM | WEIGHT: 120 LBS | SYSTOLIC BLOOD PRESSURE: 96 MMHG | TEMPERATURE: 98.4 F | DIASTOLIC BLOOD PRESSURE: 59 MMHG | OXYGEN SATURATION: 100 % | HEART RATE: 58 BPM

## 2023-07-05 DIAGNOSIS — I95.9 HYPOTENSION, UNSPECIFIED HYPOTENSION TYPE: Primary | ICD-10-CM

## 2023-07-05 DIAGNOSIS — R42 DIZZINESS: ICD-10-CM

## 2023-07-05 LAB
ANION GAP SERPL CALCULATED.3IONS-SCNC: 8 MMOL/L (ref 7–16)
BUN SERPL-MCNC: 25 MG/DL (ref 6–23)
CALCIUM SERPL-MCNC: 8.6 MG/DL (ref 8.6–10.2)
CHLORIDE SERPL-SCNC: 103 MMOL/L (ref 98–107)
CO2 SERPL-SCNC: 28 MMOL/L (ref 22–29)
CREAT SERPL-MCNC: 1 MG/DL (ref 0.5–1)
ERYTHROCYTE [DISTWIDTH] IN BLOOD BY AUTOMATED COUNT: 12.7 FL (ref 11.5–15)
GLUCOSE SERPL-MCNC: 109 MG/DL (ref 74–99)
HCT VFR BLD AUTO: 35.7 % (ref 34–48)
HGB BLD-MCNC: 11.4 G/DL (ref 11.5–15.5)
MAGNESIUM SERPL-MCNC: 2.5 MG/DL (ref 1.6–2.6)
MCH RBC QN AUTO: 30.6 PG (ref 26–35)
MCHC RBC AUTO-ENTMCNC: 31.9 % (ref 32–34.5)
MCV RBC AUTO: 96 FL (ref 80–99.9)
PLATELET # BLD AUTO: 155 E9/L (ref 130–450)
PMV BLD AUTO: 10.2 FL (ref 7–12)
POTASSIUM SERPL-SCNC: 4.6 MMOL/L (ref 3.5–5)
RBC # BLD AUTO: 3.72 E12/L (ref 3.5–5.5)
SODIUM SERPL-SCNC: 139 MMOL/L (ref 132–146)
WBC # BLD: 5.2 E9/L (ref 4.5–11.5)

## 2023-07-05 PROCEDURE — 96360 HYDRATION IV INFUSION INIT: CPT

## 2023-07-05 PROCEDURE — 93005 ELECTROCARDIOGRAM TRACING: CPT | Performed by: EMERGENCY MEDICINE

## 2023-07-05 PROCEDURE — 80048 BASIC METABOLIC PNL TOTAL CA: CPT

## 2023-07-05 PROCEDURE — 85027 COMPLETE CBC AUTOMATED: CPT

## 2023-07-05 PROCEDURE — 83735 ASSAY OF MAGNESIUM: CPT

## 2023-07-05 PROCEDURE — 99284 EMERGENCY DEPT VISIT MOD MDM: CPT

## 2023-07-05 PROCEDURE — 2580000003 HC RX 258: Performed by: EMERGENCY MEDICINE

## 2023-07-05 RX ORDER — 0.9 % SODIUM CHLORIDE 0.9 %
1000 INTRAVENOUS SOLUTION INTRAVENOUS ONCE
Status: COMPLETED | OUTPATIENT
Start: 2023-07-05 | End: 2023-07-05

## 2023-07-05 RX ADMIN — SODIUM CHLORIDE 1000 ML: 9 INJECTION, SOLUTION INTRAVENOUS at 19:47

## 2023-07-05 ASSESSMENT — PAIN - FUNCTIONAL ASSESSMENT
PAIN_FUNCTIONAL_ASSESSMENT: NONE - DENIES PAIN
PAIN_FUNCTIONAL_ASSESSMENT: 0-10

## 2023-07-05 ASSESSMENT — PAIN SCALES - GENERAL: PAINLEVEL_OUTOF10: 4

## 2023-07-05 ASSESSMENT — LIFESTYLE VARIABLES
HOW OFTEN DO YOU HAVE A DRINK CONTAINING ALCOHOL: NEVER
HOW MANY STANDARD DRINKS CONTAINING ALCOHOL DO YOU HAVE ON A TYPICAL DAY: PATIENT DOES NOT DRINK

## 2023-07-06 LAB
EKG ATRIAL RATE: 52 BPM
EKG P AXIS: 78 DEGREES
EKG P-R INTERVAL: 172 MS
EKG Q-T INTERVAL: 466 MS
EKG QRS DURATION: 74 MS
EKG QTC CALCULATION (BAZETT): 433 MS
EKG R AXIS: 70 DEGREES
EKG T AXIS: 38 DEGREES
EKG VENTRICULAR RATE: 52 BPM

## 2023-07-06 PROCEDURE — 93010 ELECTROCARDIOGRAM REPORT: CPT | Performed by: INTERNAL MEDICINE

## 2023-07-11 ENCOUNTER — APPOINTMENT (OUTPATIENT)
Dept: GENERAL RADIOLOGY | Age: 68
End: 2023-07-11
Payer: MEDICARE

## 2023-07-11 ENCOUNTER — APPOINTMENT (OUTPATIENT)
Dept: CT IMAGING | Age: 68
End: 2023-07-11
Payer: MEDICARE

## 2023-07-11 ENCOUNTER — APPOINTMENT (OUTPATIENT)
Dept: MRI IMAGING | Age: 68
End: 2023-07-11
Payer: MEDICARE

## 2023-07-11 ENCOUNTER — HOSPITAL ENCOUNTER (EMERGENCY)
Age: 68
Discharge: HOME | End: 2023-07-11
Attending: STUDENT IN AN ORGANIZED HEALTH CARE EDUCATION/TRAINING PROGRAM
Payer: MEDICARE

## 2023-07-11 VITALS
RESPIRATION RATE: 16 BRPM | HEART RATE: 53 BPM | TEMPERATURE: 97.9 F | OXYGEN SATURATION: 98 % | DIASTOLIC BLOOD PRESSURE: 54 MMHG | SYSTOLIC BLOOD PRESSURE: 95 MMHG

## 2023-07-11 DIAGNOSIS — R93.89 ABNORMAL CT SCAN: ICD-10-CM

## 2023-07-11 DIAGNOSIS — R42 LIGHTHEADEDNESS: Primary | ICD-10-CM

## 2023-07-11 LAB
ALBUMIN SERPL-MCNC: 4 G/DL (ref 3.5–5.2)
ALP SERPL-CCNC: 51 U/L (ref 35–104)
ALT SERPL-CCNC: 19 U/L (ref 0–32)
ANION GAP SERPL CALCULATED.3IONS-SCNC: 7 MMOL/L (ref 7–16)
AST SERPL-CCNC: 28 U/L (ref 0–31)
BACTERIA URNS QL MICRO: NORMAL /HPF
BASOPHILS # BLD: 0.04 E9/L (ref 0–0.2)
BASOPHILS NFR BLD: 0.7 % (ref 0–2)
BILIRUB SERPL-MCNC: <0.2 MG/DL (ref 0–1.2)
BILIRUB UR QL STRIP: NEGATIVE
BUN SERPL-MCNC: 23 MG/DL (ref 6–23)
CALCIUM SERPL-MCNC: 8.8 MG/DL (ref 8.6–10.2)
CHLORIDE SERPL-SCNC: 101 MMOL/L (ref 98–107)
CLARITY UR: CLEAR
CO2 SERPL-SCNC: 29 MMOL/L (ref 22–29)
COLOR UR: YELLOW
CREAT SERPL-MCNC: 0.9 MG/DL (ref 0.5–1)
EKG ATRIAL RATE: 55 BPM
EKG P AXIS: 81 DEGREES
EKG P-R INTERVAL: 160 MS
EKG Q-T INTERVAL: 446 MS
EKG QRS DURATION: 76 MS
EKG QTC CALCULATION (BAZETT): 426 MS
EKG R AXIS: 70 DEGREES
EKG T AXIS: 51 DEGREES
EKG VENTRICULAR RATE: 55 BPM
EOSINOPHIL # BLD: 0.13 E9/L (ref 0.05–0.5)
EOSINOPHIL NFR BLD: 2.4 % (ref 0–6)
ERYTHROCYTE [DISTWIDTH] IN BLOOD BY AUTOMATED COUNT: 12.9 FL (ref 11.5–15)
GLUCOSE SERPL-MCNC: 129 MG/DL (ref 74–99)
GLUCOSE UR STRIP-MCNC: NEGATIVE MG/DL
HCT VFR BLD AUTO: 36.5 % (ref 34–48)
HGB BLD-MCNC: 12.2 G/DL (ref 11.5–15.5)
HGB UR QL STRIP: NEGATIVE
IMM GRANULOCYTES # BLD: 0.05 E9/L
IMM GRANULOCYTES NFR BLD: 0.9 % (ref 0–5)
KETONES UR STRIP-MCNC: NEGATIVE MG/DL
LEUKOCYTE ESTERASE UR QL STRIP: NEGATIVE
LYMPHOCYTES # BLD: 1.3 E9/L (ref 1.5–4)
LYMPHOCYTES NFR BLD: 24.1 % (ref 20–42)
MCH RBC QN AUTO: 30.7 PG (ref 26–35)
MCHC RBC AUTO-ENTMCNC: 33.4 % (ref 32–34.5)
MCV RBC AUTO: 91.7 FL (ref 80–99.9)
MONOCYTES # BLD: 0.5 E9/L (ref 0.1–0.95)
MONOCYTES NFR BLD: 9.3 % (ref 2–12)
NEUTROPHILS # BLD: 3.38 E9/L (ref 1.8–7.3)
NEUTS SEG NFR BLD: 62.6 % (ref 43–80)
NITRITE UR QL STRIP: NEGATIVE
PH UR STRIP: 6 [PH] (ref 5–9)
PLATELET # BLD AUTO: 184 E9/L (ref 130–450)
PMV BLD AUTO: 10.1 FL (ref 7–12)
POTASSIUM SERPL-SCNC: 4.3 MMOL/L (ref 3.5–5)
PROT SERPL-MCNC: 6.5 G/DL (ref 6.4–8.3)
PROT UR STRIP-MCNC: NEGATIVE MG/DL
RBC # BLD AUTO: 3.98 E12/L (ref 3.5–5.5)
RBC #/AREA URNS HPF: NORMAL /HPF (ref 0–2)
RENAL EPITHELIAL, UA: NORMAL /HPF
SODIUM SERPL-SCNC: 137 MMOL/L (ref 132–146)
SP GR UR STRIP: 1.01 (ref 1–1.03)
T4 FREE SERPL-MCNC: 0.83 NG/DL (ref 0.93–1.7)
TSH SERPL-MCNC: 1.49 UIU/ML (ref 0.27–4.2)
UROBILINOGEN UR STRIP-ACNC: 0.2 E.U./DL
WBC # BLD: 5.4 E9/L (ref 4.5–11.5)
WBC #/AREA URNS HPF: NORMAL /HPF (ref 0–5)

## 2023-07-11 PROCEDURE — 6370000000 HC RX 637 (ALT 250 FOR IP): Performed by: STUDENT IN AN ORGANIZED HEALTH CARE EDUCATION/TRAINING PROGRAM

## 2023-07-11 PROCEDURE — 80053 COMPREHEN METABOLIC PANEL: CPT

## 2023-07-11 PROCEDURE — 81001 URINALYSIS AUTO W/SCOPE: CPT

## 2023-07-11 PROCEDURE — 85025 COMPLETE CBC W/AUTO DIFF WBC: CPT

## 2023-07-11 PROCEDURE — 70551 MRI BRAIN STEM W/O DYE: CPT

## 2023-07-11 PROCEDURE — 6360000002 HC RX W HCPCS: Performed by: STUDENT IN AN ORGANIZED HEALTH CARE EDUCATION/TRAINING PROGRAM

## 2023-07-11 PROCEDURE — 96374 THER/PROPH/DIAG INJ IV PUSH: CPT

## 2023-07-11 PROCEDURE — 93010 ELECTROCARDIOGRAM REPORT: CPT | Performed by: INTERNAL MEDICINE

## 2023-07-11 PROCEDURE — 70450 CT HEAD/BRAIN W/O DYE: CPT

## 2023-07-11 PROCEDURE — 84439 ASSAY OF FREE THYROXINE: CPT

## 2023-07-11 PROCEDURE — 2580000003 HC RX 258: Performed by: STUDENT IN AN ORGANIZED HEALTH CARE EDUCATION/TRAINING PROGRAM

## 2023-07-11 PROCEDURE — 71046 X-RAY EXAM CHEST 2 VIEWS: CPT

## 2023-07-11 PROCEDURE — 99285 EMERGENCY DEPT VISIT HI MDM: CPT

## 2023-07-11 PROCEDURE — 93005 ELECTROCARDIOGRAM TRACING: CPT | Performed by: STUDENT IN AN ORGANIZED HEALTH CARE EDUCATION/TRAINING PROGRAM

## 2023-07-11 PROCEDURE — 84443 ASSAY THYROID STIM HORMONE: CPT

## 2023-07-11 RX ORDER — ACETAMINOPHEN 500 MG
1000 TABLET ORAL ONCE
Status: COMPLETED | OUTPATIENT
Start: 2023-07-11 | End: 2023-07-11

## 2023-07-11 RX ORDER — 0.9 % SODIUM CHLORIDE 0.9 %
1000 INTRAVENOUS SOLUTION INTRAVENOUS ONCE
Status: COMPLETED | OUTPATIENT
Start: 2023-07-11 | End: 2023-07-11

## 2023-07-11 RX ORDER — LORAZEPAM 2 MG/ML
1 INJECTION INTRAMUSCULAR ONCE
Status: COMPLETED | OUTPATIENT
Start: 2023-07-11 | End: 2023-07-11

## 2023-07-11 RX ADMIN — SODIUM CHLORIDE 1000 ML: 9 INJECTION, SOLUTION INTRAVENOUS at 17:19

## 2023-07-11 RX ADMIN — LORAZEPAM 1 MG: 2 INJECTION INTRAMUSCULAR; INTRAVENOUS at 20:56

## 2023-07-11 RX ADMIN — ACETAMINOPHEN 1000 MG: 500 TABLET ORAL at 19:46

## 2023-07-11 ASSESSMENT — PAIN DESCRIPTION - DESCRIPTORS: DESCRIPTORS: ACHING

## 2023-07-11 ASSESSMENT — PAIN - FUNCTIONAL ASSESSMENT
PAIN_FUNCTIONAL_ASSESSMENT: NONE - DENIES PAIN
PAIN_FUNCTIONAL_ASSESSMENT: NONE - DENIES PAIN

## 2023-07-11 ASSESSMENT — PAIN SCALES - GENERAL
PAINLEVEL_OUTOF10: 8
PAINLEVEL_OUTOF10: 0

## 2023-07-11 ASSESSMENT — PAIN DESCRIPTION - LOCATION: LOCATION: HEAD

## 2023-07-11 NOTE — ED TRIAGE NOTES
FIRST PROVIDER CONTACT ASSESSMENT NOTE       Department of Emergency Medicine                 First Provider Note            23  3:22 PM EDT    Date of Encounter: No admission date for patient encounter. Patient Name: Margarita Farmer  : 1955  MRN: [id number]    Chief Complaint: Dizziness and Hypotension (In PCP's office)      History of Present Illness:   Margarita Farmer is a 79 y.o. female who presents to the ED for patient was admitted last week for hypotension. She just came from PCP office for continued hypotension and dizziness. Patient having right leg pain. Focused Physical Exam:  VS:    ED Triage Vitals   BP Temp Temp src Pulse Resp SpO2 Height Weight   -- -- -- -- -- -- -- --        Physical Ex: Constitutional: Alert and non-toxic. Medical History:  has a past medical history of Cephalgia and Migraine. Surgical History:  has a past surgical history that includes Nose surgery (Bilateral); Upper gastrointestinal endoscopy; Colonoscopy (2014); Breast enhancement surgery; and hernia repair (Left). Social History:  reports that she quit smoking about 41 years ago. Her smoking use included cigarettes. She has a 10.00 pack-year smoking history. She has never used smokeless tobacco. She reports current alcohol use. She reports that she does not use drugs. Family History: family history includes Arthritis in her brother; Colon Cancer in her maternal grandfather; Diabetes in her maternal grandfather; Heart Disease in her father; Hypertension in her father and mother; Migraines in her mother; Osteoporosis in her mother; Stroke in her mother; Thyroid Disease in her mother. Allergies: Patient has no known allergies.      Initial Plan of Care: Initiate Treatment-Testing, Proceed toTreatment Area When Bed Available for ED Attending/MLP to Continue Care      ---END OF FIRST PROVIDER CONTACT ASSESSMENT NOTE---  Electronically signed by OWODY Chang   DD: 23

## 2023-07-12 NOTE — DISCHARGE INSTRUCTIONS
MRI BRAIN WO CONTRAST (Final result)  Result time 07/11/23 22:00:46  Final result by Virgil Hankins DO (58/54/86 85:42:04)                Impression:    1. No acute intracranial hemorrhage, ischemia, or edema. 2. Focus of increased attenuation involving anterior aspect of 3rd ventricle   seen on CT performed today may represent a protein filled colloid cyst or   incidental calcified choroid. This lesion is not apparent on MRI. CT   follow-up recommended for further evaluation.

## 2023-07-12 NOTE — ED NOTES
Dr. Kamran Cooper notified of blood pressure and ok with discharge.        Kendra Dior RN  07/11/23 5726

## 2023-08-01 ENCOUNTER — HOSPITAL ENCOUNTER (EMERGENCY)
Age: 68
Discharge: HOME | End: 2023-08-01
Attending: EMERGENCY MEDICINE
Payer: MEDICARE

## 2023-08-01 ENCOUNTER — APPOINTMENT (OUTPATIENT)
Dept: CT IMAGING | Age: 68
End: 2023-08-01
Payer: MEDICARE

## 2023-08-01 VITALS
SYSTOLIC BLOOD PRESSURE: 115 MMHG | RESPIRATION RATE: 19 BRPM | BODY MASS INDEX: 19.99 KG/M2 | HEIGHT: 65 IN | DIASTOLIC BLOOD PRESSURE: 72 MMHG | OXYGEN SATURATION: 95 % | TEMPERATURE: 97.7 F | HEART RATE: 58 BPM | WEIGHT: 120 LBS

## 2023-08-01 DIAGNOSIS — E86.1 HYPOTENSION DUE TO HYPOVOLEMIA: ICD-10-CM

## 2023-08-01 DIAGNOSIS — R42 DIZZINESS: Primary | ICD-10-CM

## 2023-08-01 DIAGNOSIS — E86.0 DEHYDRATION: ICD-10-CM

## 2023-08-01 DIAGNOSIS — I95.89 HYPOTENSION DUE TO HYPOVOLEMIA: ICD-10-CM

## 2023-08-01 LAB
ANION GAP SERPL CALCULATED.3IONS-SCNC: 9 MMOL/L (ref 7–16)
BASOPHILS # BLD: 0.03 K/UL (ref 0–0.2)
BASOPHILS NFR BLD: 1 % (ref 0–2)
BILIRUB UR QL STRIP: NEGATIVE
BUN SERPL-MCNC: 22 MG/DL (ref 6–23)
CALCIUM SERPL-MCNC: 9.1 MG/DL (ref 8.6–10.2)
CHLORIDE SERPL-SCNC: 102 MMOL/L (ref 98–107)
CLARITY UR: CLEAR
CO2 SERPL-SCNC: 28 MMOL/L (ref 22–29)
COLOR UR: YELLOW
COMMENT: ABNORMAL
CORTIS SERPL-MCNC: 9.6 UG/DL (ref 2.7–18.4)
CORTISOL COLLECTION INFO: NORMAL
CREAT SERPL-MCNC: 1 MG/DL (ref 0.5–1)
EKG ATRIAL RATE: 54 BPM
EKG P AXIS: 3 DEGREES
EKG P-R INTERVAL: 152 MS
EKG Q-T INTERVAL: 444 MS
EKG QRS DURATION: 74 MS
EKG QTC CALCULATION (BAZETT): 421 MS
EKG R AXIS: 69 DEGREES
EKG T AXIS: 58 DEGREES
EKG VENTRICULAR RATE: 54 BPM
EOSINOPHIL # BLD: 0.13 K/UL (ref 0.05–0.5)
EOSINOPHILS RELATIVE PERCENT: 3 % (ref 0–6)
ERYTHROCYTE [DISTWIDTH] IN BLOOD BY AUTOMATED COUNT: 12.5 % (ref 11.5–15)
GFR SERPL CREATININE-BSD FRML MDRD: 60 ML/MIN/1.73M2
GLUCOSE SERPL-MCNC: 73 MG/DL (ref 74–99)
GLUCOSE UR STRIP-MCNC: NEGATIVE MG/DL
HCT VFR BLD AUTO: 37.1 % (ref 34–48)
HGB BLD-MCNC: 12.2 G/DL (ref 11.5–15.5)
HGB UR QL STRIP.AUTO: NEGATIVE
IMM GRANULOCYTES # BLD AUTO: <0.03 K/UL (ref 0–0.58)
IMM GRANULOCYTES NFR BLD: 0 % (ref 0–5)
KETONES UR STRIP-MCNC: ABNORMAL MG/DL
LEUKOCYTE ESTERASE UR QL STRIP: NEGATIVE
LYMPHOCYTES NFR BLD: 1.22 K/UL (ref 1.5–4)
LYMPHOCYTES RELATIVE PERCENT: 27 % (ref 20–42)
MCH RBC QN AUTO: 31 PG (ref 26–35)
MCHC RBC AUTO-ENTMCNC: 32.9 G/DL (ref 32–34.5)
MCV RBC AUTO: 94.2 FL (ref 80–99.9)
MONOCYTES NFR BLD: 0.44 K/UL (ref 0.1–0.95)
MONOCYTES NFR BLD: 10 % (ref 2–12)
NEUTROPHILS NFR BLD: 59 % (ref 43–80)
NEUTS SEG NFR BLD: 2.66 K/UL (ref 1.8–7.3)
NITRITE UR QL STRIP: NEGATIVE
PH UR STRIP: 6.5 [PH] (ref 5–9)
PLATELET # BLD AUTO: 157 K/UL (ref 130–450)
PMV BLD AUTO: 10.1 FL (ref 7–12)
POTASSIUM SERPL-SCNC: 4.6 MMOL/L (ref 3.5–5)
PROT UR STRIP-MCNC: NEGATIVE MG/DL
RBC # BLD AUTO: 3.94 M/UL (ref 3.5–5.5)
SODIUM SERPL-SCNC: 139 MMOL/L (ref 132–146)
SP GR UR STRIP: <1.005 (ref 1–1.03)
TROPONIN I SERPL HS-MCNC: 15 NG/L (ref 0–9)
TROPONIN I SERPL HS-MCNC: 17 NG/L (ref 0–9)
TSH SERPL DL<=0.05 MIU/L-ACNC: 2.54 UIU/ML (ref 0.27–4.2)
UROBILINOGEN UR STRIP-ACNC: 0.2 EU/DL (ref 0–1)
WBC OTHER # BLD: 4.5 K/UL (ref 4.5–11.5)

## 2023-08-01 PROCEDURE — 84443 ASSAY THYROID STIM HORMONE: CPT

## 2023-08-01 PROCEDURE — 2580000003 HC RX 258

## 2023-08-01 PROCEDURE — 93005 ELECTROCARDIOGRAM TRACING: CPT | Performed by: EMERGENCY MEDICINE

## 2023-08-01 PROCEDURE — 99284 EMERGENCY DEPT VISIT MOD MDM: CPT

## 2023-08-01 PROCEDURE — 93010 ELECTROCARDIOGRAM REPORT: CPT | Performed by: INTERNAL MEDICINE

## 2023-08-01 PROCEDURE — 96360 HYDRATION IV INFUSION INIT: CPT

## 2023-08-01 PROCEDURE — 80048 BASIC METABOLIC PNL TOTAL CA: CPT

## 2023-08-01 PROCEDURE — 81003 URINALYSIS AUTO W/O SCOPE: CPT

## 2023-08-01 PROCEDURE — 82533 TOTAL CORTISOL: CPT

## 2023-08-01 PROCEDURE — 85025 COMPLETE CBC W/AUTO DIFF WBC: CPT

## 2023-08-01 PROCEDURE — 84484 ASSAY OF TROPONIN QUANT: CPT

## 2023-08-01 PROCEDURE — 70450 CT HEAD/BRAIN W/O DYE: CPT

## 2023-08-01 PROCEDURE — 96361 HYDRATE IV INFUSION ADD-ON: CPT

## 2023-08-01 RX ORDER — TIZANIDINE 4 MG/1
4 TABLET ORAL 3 TIMES DAILY PRN
COMMUNITY

## 2023-08-01 RX ORDER — CITALOPRAM 40 MG/1
40 TABLET ORAL EVERY MORNING
COMMUNITY

## 2023-08-01 RX ORDER — 0.9 % SODIUM CHLORIDE 0.9 %
1000 INTRAVENOUS SOLUTION INTRAVENOUS ONCE
Status: COMPLETED | OUTPATIENT
Start: 2023-08-01 | End: 2023-08-01

## 2023-08-01 RX ORDER — FLUDROCORTISONE ACETATE 0.1 MG/1
0.1 TABLET ORAL DAILY
Qty: 7 TABLET | Refills: 0 | Status: SHIPPED | OUTPATIENT
Start: 2023-08-01 | End: 2023-08-08

## 2023-08-01 RX ORDER — ONDANSETRON 4 MG/1
4 TABLET, FILM COATED ORAL EVERY 8 HOURS PRN
COMMUNITY

## 2023-08-01 RX ORDER — OCTISALATE, AVOBENZONE, HOMOSALATE, AND OCTOCRYLENE 29.4; 29.4; 49; 25.48 MG/ML; MG/ML; MG/ML; MG/ML
4 LOTION TOPICAL EVERY MORNING
COMMUNITY

## 2023-08-01 RX ORDER — VITAMIN B COMPLEX
1 CAPSULE ORAL EVERY MORNING
COMMUNITY

## 2023-08-01 RX ORDER — ACETAMINOPHEN, ASPIRIN AND CAFFEINE 250; 250; 65 MG/1; MG/1; MG/1
1 TABLET, FILM COATED ORAL EVERY 8 HOURS PRN
COMMUNITY

## 2023-08-01 RX ORDER — IBANDRONATE SODIUM 150 MG/1
150 TABLET, FILM COATED ORAL
COMMUNITY

## 2023-08-01 RX ORDER — LANOLIN ALCOHOL/MO/W.PET/CERES
400 CREAM (GRAM) TOPICAL EVERY MORNING
COMMUNITY

## 2023-08-01 RX ORDER — M-VIT,TX,IRON,MINS/CALC/FOLIC 27MG-0.4MG
1 TABLET ORAL EVERY MORNING
COMMUNITY

## 2023-08-01 RX ORDER — MULTIVITAMIN WITH IRON
250 TABLET ORAL EVERY MORNING
COMMUNITY

## 2023-08-01 RX ADMIN — SODIUM CHLORIDE 1000 ML: 9 INJECTION, SOLUTION INTRAVENOUS at 12:48

## 2023-08-01 NOTE — ED PROVIDER NOTES
Marleni Bullcoral is a 79 y.o. female    HPI  Marleni Monreal is a 79 y.o. female presenting to the ED for Shortness of Breath and Other (Hypotension; was sent by Dr. Nguyen for 80/50 BP in office)    History comes primarily from the patient. Patient presents for dizziness and hypotension. The patient denied any shortness of breath to me. The patient has had episodes in the past similar to this where she gets very dizzy/lightheaded and has low blood pressure. The patient was seen in the office by her primary care physician and noted to have blood pressure of 80/50 and sent in. In the past when this is happened, the patient has appeared very dehydrated. The patient says she feels dehydrated now. She does not admit to significantly decrease fluid intake but her lips feel dry. Patient denies any urinary symptoms, chest pain, shortness of breath, abdominal pain, headache, cardiac history, fever. By the time I see the patient, the patient's dizziness has resolved. ROS  Full review of systems completed. Pertinent positives and negatives per the HPI, unless otherwise stated ROS is negative. Physical Exam  Vitals and nursing note reviewed. Constitutional:       General: She is not in acute distress. Appearance: Normal appearance. She is well-developed. She is not ill-appearing. HENT:      Head: Normocephalic and atraumatic. Right Ear: External ear normal.      Left Ear: External ear normal.      Nose: Nose normal. No rhinorrhea. Mouth/Throat:      Mouth: Mucous membranes are moist.      Pharynx: Oropharynx is clear. Eyes:      Extraocular Movements: Extraocular movements intact. Conjunctiva/sclera: Conjunctivae normal.      Pupils: Pupils are equal, round, and reactive to light. Cardiovascular:      Rate and Rhythm: Normal rate and regular rhythm. Pulses: Normal pulses. Heart sounds: Normal heart sounds. No murmur heard.   Pulmonary:      Effort: Pulmonary effort is

## 2023-08-02 NOTE — ED PROVIDER NOTES
[facility] ENCOUNTER      Pt Name: Roni Cody  MRN: [id number]  [address] [facility] [place] 1955  Date of evaluation: 8/1/2023  PCP: Bryant Rich, DO    Vitals:    08/01/23 1651   BP: 115/72   Pulse: 58   Resp: 19   Temp:    SpO2: 95%        Patient Care Assumed From: Dr. Joseluis Jackson received from [address] [place] [address] resident, assuming care of patient at this time. See outgoing resident note for further information obtained prior to signout. I have discussed the patient's initial exam, treatment and plan of care with the out going physician. I have introduced my self to the patient / family and have answered their questions to this point. I have examined the patient myself and reviewed ordered tests / medications and  reviewed any available results to this point. Abbreviated history per resident: Pt presented from primary care doctor's office for hypotension. Pt has had this problem in the past and has been worked up for it. Pending for Disposition: repeat orthostatics    Planned Disposition: discharge    Reevaluations:    ED Course as of 08/02/23 0148   Tue Aug 01, 2023   1252 EKG shows sinus bradycardia 54 beats minute minute no signs of ST changes no ST ovation QTc 421. No change from prior EKG. Chronic bradycardia. EKG interpreted myself. [KK]   1553 Negative orthostatics [KS]   1800 Patient signed out to me by Dr. Iliana Dupree. She is resting comfortably at this time. She has no new complaints.  [AD]   2017 Repeat orthostatics are negative blood pressure 115/72 no decrease with standing. Patient is asymptomatic and very eager for discharge home. I spoke with Dr. Vonnie Harris her PCP who knows her quite well. He states he has admitted her multiple times in the past for that she has been seen by neurology and endocrine without a source found. He is comfortable with her being discharged home.   Once her started on Florinef 0.1 mg tablets daily for

## 2023-10-09 ENCOUNTER — OFFICE VISIT (OUTPATIENT)
Dept: PODIATRY | Age: 68
End: 2023-10-09
Payer: MEDICARE

## 2023-10-09 VITALS — BODY MASS INDEX: 19.99 KG/M2 | WEIGHT: 120 LBS | HEIGHT: 65 IN

## 2023-10-09 DIAGNOSIS — B35.1 ONYCHOMYCOSIS: Primary | ICD-10-CM

## 2023-10-09 DIAGNOSIS — L60.0 OC (ONYCHOCRYPTOSIS): ICD-10-CM

## 2023-10-09 PROCEDURE — G8427 DOCREV CUR MEDS BY ELIG CLIN: HCPCS | Performed by: PODIATRIST

## 2023-10-09 PROCEDURE — G8399 PT W/DXA RESULTS DOCUMENT: HCPCS | Performed by: PODIATRIST

## 2023-10-09 PROCEDURE — G8484 FLU IMMUNIZE NO ADMIN: HCPCS | Performed by: PODIATRIST

## 2023-10-09 PROCEDURE — 3017F COLORECTAL CA SCREEN DOC REV: CPT | Performed by: PODIATRIST

## 2023-10-09 PROCEDURE — G8420 CALC BMI NORM PARAMETERS: HCPCS | Performed by: PODIATRIST

## 2023-10-09 PROCEDURE — 1123F ACP DISCUSS/DSCN MKR DOCD: CPT | Performed by: PODIATRIST

## 2023-10-09 PROCEDURE — 1036F TOBACCO NON-USER: CPT | Performed by: PODIATRIST

## 2023-10-09 PROCEDURE — 99213 OFFICE O/P EST LOW 20 MIN: CPT | Performed by: PODIATRIST

## 2023-10-09 PROCEDURE — 1090F PRES/ABSN URINE INCON ASSESS: CPT | Performed by: PODIATRIST

## 2023-10-09 NOTE — PROGRESS NOTES
10/9/23     Isis Nails    : 1955   Sex: female    Age: 76 y.o. Patient's PCP/Provider is:  Supriya Whitehead DO    Subjective:  Patient is seen today for follow-up regarding continued care regarding nail dystrophy issues ingrown issues right foot. Patient stated she has tried trimming her nails herself with modest improvement in symptoms. She denies any additional issues at this time. Chief Complaint   Patient presents with    Nail Problem     Right foot nail care        ROS:  Const: Positives and pertinent negatives as per HPI. Musculo: Denies symptoms other than stated above. Neuro: Denies symptoms other than stated above. Skin: Denies symptoms other than stated above. Current Medications:    Current Outpatient Medications:     ciclopirox (PENLAC) 8 % solution, Apply topically daily to affected nails. , Disp: 6 mL, Rfl: 2    tiZANidine (ZANAFLEX) 4 MG tablet, Take 1 tablet by mouth 3 times daily as needed (MUSCLE SPAMS), Disp: , Rfl:     citalopram (CELEXA) 40 MG tablet, Take 1 tablet by mouth every morning, Disp: , Rfl:     Probiotic Product (ALIGN) 4 MG CAPS, Take 4 mg by mouth every morning, Disp: , Rfl:     MISC NATURAL PRODUCTS PO, Take 2 capsules by mouth every morning PRODUCT: VIVISCAL (HAIR GROWTH), Disp: , Rfl:     folic acid (FOLVITE) 498 MCG tablet, Take 1 tablet by mouth every morning, Disp: , Rfl:     magnesium (MAGNESIUM-OXIDE) 250 MG TABS tablet, Take 1 tablet by mouth every morning, Disp: , Rfl:     b complex vitamins capsule, Take 1 capsule by mouth every morning, Disp: , Rfl:     Multiple Vitamins-Minerals (THERAPEUTIC MULTIVITAMIN-MINERALS) tablet, Take 1 tablet by mouth every morning, Disp: , Rfl:     ondansetron (ZOFRAN) 4 MG tablet, Take 1 tablet by mouth every 8 hours as needed for Nausea or Vomiting, Disp: , Rfl:     aspirin-acetaminophen-caffeine (EXCEDRIN MIGRAINE) 250-250-65 MG per tablet, Take 1 tablet by mouth every 8 hours as needed for Headaches, Disp: ,

## 2023-10-09 NOTE — PROGRESS NOTES
Patient in today for right foot nail care. Patient does not have any complaints of pain at this time. Patient's PCP is Laurie Porter DO date of last ov 08/16/2023.          Kerrie Manjarrez LPN

## 2024-02-19 ENCOUNTER — HOSPITAL ENCOUNTER (OUTPATIENT)
Age: 69
Discharge: HOME | End: 2024-02-21

## 2024-02-19 PROCEDURE — 88305 TISSUE EXAM BY PATHOLOGIST: CPT

## 2024-02-22 LAB — SURGICAL PATHOLOGY REPORT: NORMAL

## 2024-04-05 ENCOUNTER — APPOINTMENT (OUTPATIENT)
Dept: CARDIOLOGY | Facility: CLINIC | Age: 69
End: 2024-04-05
Payer: MEDICARE

## 2024-04-08 PROBLEM — M79.674 PAIN OF TOE OF RIGHT FOOT: Status: ACTIVE | Noted: 2020-10-19

## 2024-04-08 PROBLEM — M79.672 PAIN IN LEFT FOOT: Status: ACTIVE | Noted: 2021-06-16

## 2024-04-08 PROBLEM — M72.2 PLANTAR FASCIITIS: Status: ACTIVE | Noted: 2021-06-16

## 2024-04-08 PROBLEM — M85.89 OSTEOPENIA OF MULTIPLE SITES: Status: ACTIVE | Noted: 2023-01-11

## 2024-04-08 PROBLEM — G43.119 INTRACTABLE MIGRAINE WITH AURA WITHOUT STATUS MIGRAINOSUS: Status: ACTIVE | Noted: 2017-03-06

## 2024-04-08 PROBLEM — B35.1 ONYCHOMYCOSIS: Status: ACTIVE | Noted: 2020-10-19

## 2024-04-08 PROBLEM — M21.611 BUNION, RIGHT FOOT: Status: ACTIVE | Noted: 2020-10-19

## 2024-04-08 PROBLEM — L84 CORNS AND CALLUS: Status: ACTIVE | Noted: 2022-09-28

## 2024-04-08 RX ORDER — MIDODRINE HYDROCHLORIDE 5 MG/1
15 TABLET ORAL 3 TIMES DAILY
COMMUNITY
Start: 2024-02-27

## 2024-04-08 RX ORDER — MULTIVITAMIN/IRON/FOLIC ACID 18MG-0.4MG
1 TABLET ORAL DAILY
COMMUNITY

## 2024-04-08 RX ORDER — SUMATRIPTAN SUCCINATE 100 MG/1
100 TABLET ORAL
COMMUNITY
Start: 2022-11-18

## 2024-04-08 RX ORDER — FLUDROCORTISONE ACETATE 0.1 MG/1
0.2 TABLET ORAL
COMMUNITY
Start: 2024-03-29

## 2024-04-08 RX ORDER — ACETAMINOPHEN 500 MG
500 TABLET ORAL EVERY 6 HOURS PRN
COMMUNITY

## 2024-04-08 RX ORDER — BUPROPION HYDROCHLORIDE 150 MG/1
150 TABLET, EXTENDED RELEASE ORAL DAILY
COMMUNITY
End: 2024-04-09 | Stop reason: WASHOUT

## 2024-04-08 RX ORDER — CALCIUM CARBONATE 300MG(750)
400 TABLET,CHEWABLE ORAL DAILY
COMMUNITY

## 2024-04-08 RX ORDER — VIT C/E/ZN/COPPR/LUTEIN/ZEAXAN 250MG-90MG
1000 CAPSULE ORAL
COMMUNITY
Start: 2022-03-24

## 2024-04-08 RX ORDER — IBANDRONATE SODIUM 150 MG/1
150 TABLET, FILM COATED ORAL
COMMUNITY

## 2024-04-08 RX ORDER — BIOTIN 10 MG
10 TABLET ORAL
COMMUNITY
Start: 2022-09-09 | End: 2024-04-09 | Stop reason: WASHOUT

## 2024-04-08 RX ORDER — BISMUTH SUBSALICYLATE 262 MG
1 TABLET,CHEWABLE ORAL DAILY
COMMUNITY

## 2024-04-08 RX ORDER — FOLIC ACID 0.4 MG
1 TABLET ORAL
COMMUNITY
End: 2024-04-09 | Stop reason: WASHOUT

## 2024-04-08 RX ORDER — PANTOPRAZOLE SODIUM 40 MG/1
40 TABLET, DELAYED RELEASE ORAL
COMMUNITY

## 2024-04-08 RX ORDER — ONDANSETRON 4 MG/1
4 TABLET, FILM COATED ORAL EVERY 8 HOURS PRN
COMMUNITY
Start: 2022-09-09

## 2024-04-08 RX ORDER — ALUMINUM ZIRCONIUM OCTACHLOROHYDREX GLY 16 G/100G
4 GEL TOPICAL
COMMUNITY
End: 2024-04-09 | Stop reason: WASHOUT

## 2024-04-08 RX ORDER — TIZANIDINE 4 MG/1
4 TABLET ORAL EVERY 8 HOURS PRN
COMMUNITY
Start: 2022-09-09

## 2024-04-08 RX ORDER — AMITRIPTYLINE HYDROCHLORIDE 10 MG/1
10 TABLET, FILM COATED ORAL NIGHTLY
COMMUNITY

## 2024-04-08 RX ORDER — CITALOPRAM 40 MG/1
40 TABLET, FILM COATED ORAL
COMMUNITY
Start: 2020-11-10 | End: 2024-04-09 | Stop reason: WASHOUT

## 2024-04-09 ENCOUNTER — OFFICE VISIT (OUTPATIENT)
Dept: CARDIOLOGY | Facility: CLINIC | Age: 69
End: 2024-04-09
Payer: MEDICARE

## 2024-04-09 VITALS
SYSTOLIC BLOOD PRESSURE: 162 MMHG | DIASTOLIC BLOOD PRESSURE: 88 MMHG | OXYGEN SATURATION: 97 % | HEIGHT: 65 IN | HEART RATE: 69 BPM | BODY MASS INDEX: 19.66 KG/M2 | WEIGHT: 118 LBS

## 2024-04-09 DIAGNOSIS — R55 SYNCOPE AND COLLAPSE: ICD-10-CM

## 2024-04-09 DIAGNOSIS — I95.1 ORTHOSTATIC HYPOTENSION: ICD-10-CM

## 2024-04-09 PROCEDURE — 93000 ELECTROCARDIOGRAM COMPLETE: CPT | Performed by: STUDENT IN AN ORGANIZED HEALTH CARE EDUCATION/TRAINING PROGRAM

## 2024-04-09 PROCEDURE — 1159F MED LIST DOCD IN RCRD: CPT | Performed by: STUDENT IN AN ORGANIZED HEALTH CARE EDUCATION/TRAINING PROGRAM

## 2024-04-09 PROCEDURE — 1036F TOBACCO NON-USER: CPT | Performed by: STUDENT IN AN ORGANIZED HEALTH CARE EDUCATION/TRAINING PROGRAM

## 2024-04-09 PROCEDURE — 99204 OFFICE O/P NEW MOD 45 MIN: CPT | Performed by: STUDENT IN AN ORGANIZED HEALTH CARE EDUCATION/TRAINING PROGRAM

## 2024-04-09 RX ORDER — ALBUTEROL SULFATE 90 UG/1
AEROSOL, METERED RESPIRATORY (INHALATION)
COMMUNITY
Start: 2024-01-16

## 2024-04-09 RX ORDER — MIDODRINE HYDROCHLORIDE 10 MG/1
10 TABLET ORAL 3 TIMES DAILY
COMMUNITY
Start: 2024-02-27

## 2024-04-09 RX ORDER — BUPROPION HYDROCHLORIDE 150 MG/1
150 TABLET ORAL
COMMUNITY
Start: 2024-03-13

## 2024-04-09 NOTE — PROGRESS NOTES
[facility] Heart and Vascular Cardiology Clinic Note    Date: 04/09/24  Time: 3:49 PM    Subjective   Subha Le is a 68 year old female who is following with cardiology at [facility] for vasovagal episodes/orthostatic hypotension.  Patient was overall stable and on good medical regimen but had an episode of hypotension recently and asked PCP if she can get another opinion.  Patient reports that since she asked for this appointment she was otherwise stable and was going to cancel the appointment but decided to come and discussed the care. Patient's past medical history is significant for migraines as well as gastritis.  Since last year in July she has been experiencing these events which include dizziness, tingling sensation in her lips, feeling weak and tired and some episode culminated in her passing out.  After the event she is fatigued and tired and lethargic.  She endorses some tightness and fatigue in her legs prior to events.  She does not have any nausea or vomiting during the episode.  She has been on Florinef and midodrine.  Overall she has been stable and had 1 episode of hypotension since her last visit with primary cardiologist.      Today her blood pressure is on the higher side.  She denies any new complaints.    She reports that she is drinking at least 60 ounces of fluid every day.  She is not on any salt tablet.    She had echocardiogram last year for similar complaints which was reportedly unremarkable.  I do not have images of that to review.        Review of Systems:  Otherwise, limited cardiovascular review of systems is negative.        Medical History:   She has no past medical history on file.  Surgical History:   Past Surgical History:   Procedure Laterality Date    CHOLECYSTECTOMY     PSHP@  Social History:   Social Determinants of Health with Concerns     Tobacco Use: Medium Risk (4/9/2024)    Patient History     Smoking Tobacco Use: Former     Smokeless Tobacco Use: Never      Passive Exposure: Not on file   Alcohol Use: Not on file   Financial Resource Strain: Not on file   Food Insecurity: Not on file   Transportation Needs: Not on file   Physical Activity: Not on file   Stress: Not on file   Social Connections: Not on file   Intimate Partner Violence: Not on file   Depression: Not on file   Housing Stability: Not on file   Utilities: Not on file   Digital Equity: Not on file     Family History:   Family History   Problem Relation Name Age of Onset    Migraines Mother      Other (circulation problems [Other]) Father      Other (bypass surgery) Father        Allergies:  Patient has no known allergies.    Outpatient Medications:  Current Outpatient Medications   Medication Instructions    acetaminophen (TYLENOL) 500 mg, oral, Every 6 hours PRN    albuterol 90 mcg/actuation inhaler INHALE TWO PUFFS BY MOUTH EVERY 4 TO 6 HOURS AS NEEDED FOR SHORTNESS OF BREATH    amitriptyline (ELAVIL) 10 mg, oral, Nightly    aspirin-acetaminophen-caffeine (Excedrin Migraine) 250-250-65 mg tablet 1 tablet, oral    b complex 0.4 mg tablet 1 tablet, oral, Daily    buPROPion XL (WELLBUTRIN XL) 150 mg, oral, Daily before breakfast    cholecalciferol (VITAMIN D-3) 1,000 Units, oral, Daily RT    fludrocortisone (FLORINEF) 0.2 mg, oral, Daily RT    ibandronate (BONIVA) 150 mg, oral    magnesium oxide (MAG-OX) 400 mg, oral, Daily    midodrine (PROAMATINE) 15 mg, oral, 3 times daily    midodrine (PROAMATINE) 10 mg, oral, 3 times daily    multivitamin tablet 1 tablet, oral, Daily    multivitamin with minerals (HAIR,SKIN AND NAILS ORAL) oral, 3 times daily    ondansetron (ZOFRAN) 4 mg, oral, Every 8 hours PRN    pantoprazole (PROTONIX) 40 mg, oral, Daily before breakfast, Do not crush, chew, or split.    SUMAtriptan (IMITREX) 100 mg, oral    tiZANidine (ZANAFLEX) 4 mg, oral, Every 8 hours PRN       Objective     Physical Exam  Vitals:    04/09/24 1318   BP: 162/88   BP Location: Left arm   Patient Position: Sitting  "  BP Cuff Size: Adult   Pulse: 69   SpO2: 97%   Weight: 53.5 kg (118 lb)   Height: 1.651 m (5' 5\")     Wt Readings from Last 3 Encounters:   04/09/24 53.5 kg (118 lb)       General: Alert and Oriented, No distress, cooperative  Head: Normocephalic without obvious abnormality, atraumatic  Eyes: Conjunctiva/corneas clear, EOM's grossly intact  Neck: Supple, trachea midline, No thyroid enlargement/tenderness/nodules; No JVD  Lungs: Clear to auscultation bilaterally, no wheezes, rhonci, or rales. respirations unlabored  Chest Wall: No tenderness or deformity  Heart: Regular rhythm, normal S1/S2, no murmur  Abdomen: Soft, non-tender, Non-distended, bowel sounds active  Extremities: No edema, no cyanosis, no clubbing  Skin: Skin color, texture, turgor normal.  No rashes or lesions noted  Neurologic: Alert and oriented x 3, grossly moving all extremities, speech intact        I have personally reviewed the following images and laboratory findings:  ECG: NSR, no block, no significant ST/T changes.  Echocardiogram: not done    Laboratory values:   No results found for any previous visit.     CBC -  No results found for: \"WBC\", \"HGB\", \"HCT\", \"MCV\", \"PLT\"    CMP -  No results found for: \"CALCIUM\", \"PHOS\", \"PROT\", \"ALBUMIN\", \"AST\", \"ALT\", \"ALKPHOS\", \"BILITOT\"    LIPID PANEL -   No results found for: \"CHOL\", \"HDL\", \"CHHDL\", \"LDL\", \"VLDL\", \"TRIG\", \"NHDL\"    RENAL FUNCTION PANEL -   No results found for: \"GLU\", \"K\", \"CHLOR\", \"PHOS\"    No results found for: \"BNP\", \"HGBA1C\"     Assessment/Plan   -Orthostatic hypotension/vasovagal phenomena  -Vasovagal syncope    Plan:  -Discussed with patient regarding etiology and mechanism of vasovagal phenomena.  I will recommend the patient to continue current medical therapy with midodrine and fludrocortisone as taking and prescribed by primary cardiology.  -We discussed regarding possible use of compression stocking and salt tablet.  Patient will reach out to cardiology/PCP to obtain " prescription if needed.  Patient has had extensive workup in the past including checking cortisol level.  Patient has not had formal evaluation for Joao's disease by endocrine.  Patient to reach out to PCP for referral.  and discussed the same.    Continue follow-up with primary cardiologist.    In addition, the following orders were placed today:  Orders Placed This Encounter   Procedures    ECG 12 Lead                 SIGNATURE: Walter Landin MD PATIENT NAME: Subha Le   DATE/TIME: April 9, 2024 3:49 PM MRN: [id number]

## 2024-05-08 ENCOUNTER — OFFICE VISIT (OUTPATIENT)
Dept: PODIATRY | Age: 69
End: 2024-05-08
Payer: MEDICARE

## 2024-05-08 VITALS — WEIGHT: 120 LBS | BODY MASS INDEX: 19.99 KG/M2 | HEIGHT: 65 IN

## 2024-05-08 DIAGNOSIS — M20.42 HAMMER TOES OF BOTH FEET: ICD-10-CM

## 2024-05-08 DIAGNOSIS — M79.671 RIGHT FOOT PAIN: ICD-10-CM

## 2024-05-08 DIAGNOSIS — L84 CORNS AND CALLUS: Primary | ICD-10-CM

## 2024-05-08 DIAGNOSIS — M20.41 HAMMER TOES OF BOTH FEET: ICD-10-CM

## 2024-05-08 DIAGNOSIS — M79.672 LEFT FOOT PAIN: ICD-10-CM

## 2024-05-08 DIAGNOSIS — M21.621 TAILOR'S BUNION OF BOTH FEET: ICD-10-CM

## 2024-05-08 DIAGNOSIS — M21.622 TAILOR'S BUNION OF BOTH FEET: ICD-10-CM

## 2024-05-08 DIAGNOSIS — R26.2 DIFFICULTY WALKING: ICD-10-CM

## 2024-05-08 PROCEDURE — 1090F PRES/ABSN URINE INCON ASSESS: CPT | Performed by: PODIATRIST

## 2024-05-08 PROCEDURE — 1036F TOBACCO NON-USER: CPT | Performed by: PODIATRIST

## 2024-05-08 PROCEDURE — G8399 PT W/DXA RESULTS DOCUMENT: HCPCS | Performed by: PODIATRIST

## 2024-05-08 PROCEDURE — 99213 OFFICE O/P EST LOW 20 MIN: CPT | Performed by: PODIATRIST

## 2024-05-08 PROCEDURE — 1123F ACP DISCUSS/DSCN MKR DOCD: CPT | Performed by: PODIATRIST

## 2024-05-08 PROCEDURE — G8420 CALC BMI NORM PARAMETERS: HCPCS | Performed by: PODIATRIST

## 2024-05-08 PROCEDURE — 3017F COLORECTAL CA SCREEN DOC REV: CPT | Performed by: PODIATRIST

## 2024-05-08 PROCEDURE — G8428 CUR MEDS NOT DOCUMENT: HCPCS | Performed by: PODIATRIST

## 2024-05-08 RX ORDER — ALBUTEROL SULFATE 90 UG/1
2 AEROSOL, METERED RESPIRATORY (INHALATION) EVERY 4 HOURS PRN
COMMUNITY
Start: 2024-01-16

## 2024-05-08 RX ORDER — AMMONIUM LACTATE 12 G/100G
CREAM TOPICAL
Qty: 385 G | Refills: 4 | Status: SHIPPED | OUTPATIENT
Start: 2024-05-08

## 2024-05-08 NOTE — PROGRESS NOTES
24     Polly Ramos    : 1955   Sex: female    Age: 68 y.o.    Patient's PCP/Provider is:  Benedict Thomas,     Subjective:  Patient is seen today for evaluation regarding multiple issues to both feet.  Patient had some issues with digital trauma with cutting her grass recently.  Patient also complaining of painful callosities to the digital regions bilateral foot.  Patient also having some mild tailor bunion issues to both feet.  Patient denies any recent injuries.  Patient has not tried any OTC treatments to this point in time.  No other additional abnormalities noted.    Chief Complaint   Patient presents with    Toe Pain     Toe pain on both feet       ROS:  Const: Positives and pertinent negatives as per HPI.    Musculo: Denies symptoms other than stated above.  Neuro: Denies symptoms other than stated above.  Skin: Denies symptoms other than stated above.    Current Medications:    Current Outpatient Medications:     albuterol sulfate HFA (PROVENTIL;VENTOLIN;PROAIR) 108 (90 Base) MCG/ACT inhaler, Inhale 2 puffs into the lungs every 4 hours as needed, Disp: , Rfl:     ammonium lactate (AMLACTIN) 12 % cream, Apply topically to affected areas daily, Disp: 385 g, Rfl: 4    ciclopirox (PENLAC) 8 % solution, Apply topically daily to affected nails., Disp: 6 mL, Rfl: 2    tiZANidine (ZANAFLEX) 4 MG tablet, Take 1 tablet by mouth 3 times daily as needed (MUSCLE SPAMS), Disp: , Rfl:     citalopram (CELEXA) 40 MG tablet, Take 1 tablet by mouth every morning, Disp: , Rfl:     Probiotic Product (ALIGN) 4 MG CAPS, Take 4 mg by mouth every morning, Disp: , Rfl:     MISC NATURAL PRODUCTS PO, Take 2 capsules by mouth every morning PRODUCT: VIVISCAL (HAIR GROWTH), Disp: , Rfl:     folic acid (FOLVITE) 400 MCG tablet, Take 1 tablet by mouth every morning, Disp: , Rfl:     magnesium (MAGNESIUM-OXIDE) 250 MG TABS tablet, Take 1 tablet by mouth every morning, Disp: , Rfl:     b complex vitamins capsule, Take 1

## 2024-05-08 NOTE — PROGRESS NOTES
Patient here for toe pain bilaterally. Patient also says she has ball on bottom of both feet. Benedict Thomas DO   Electronically signed by Mouna Phillips LPN on 5/8/2024 at 3:27 PM